# Patient Record
Sex: FEMALE | Race: ASIAN | NOT HISPANIC OR LATINO | URBAN - METROPOLITAN AREA
[De-identification: names, ages, dates, MRNs, and addresses within clinical notes are randomized per-mention and may not be internally consistent; named-entity substitution may affect disease eponyms.]

---

## 2021-03-24 ENCOUNTER — EMERGENCY (EMERGENCY)
Facility: HOSPITAL | Age: 26
LOS: 1 days | Discharge: HOME | End: 2021-03-24
Attending: EMERGENCY MEDICINE | Admitting: EMERGENCY MEDICINE
Payer: MEDICAID

## 2021-03-24 VITALS
RESPIRATION RATE: 18 BRPM | SYSTOLIC BLOOD PRESSURE: 118 MMHG | HEART RATE: 73 BPM | TEMPERATURE: 98 F | OXYGEN SATURATION: 100 % | DIASTOLIC BLOOD PRESSURE: 75 MMHG

## 2021-03-24 VITALS
OXYGEN SATURATION: 100 % | HEART RATE: 68 BPM | SYSTOLIC BLOOD PRESSURE: 135 MMHG | DIASTOLIC BLOOD PRESSURE: 80 MMHG | RESPIRATION RATE: 16 BRPM | TEMPERATURE: 98 F

## 2021-03-24 LAB
ALBUMIN SERPL ELPH-MCNC: 5 G/DL — SIGNIFICANT CHANGE UP (ref 3.3–5)
ALP SERPL-CCNC: 52 U/L — SIGNIFICANT CHANGE UP (ref 40–120)
ALT FLD-CCNC: 19 U/L — SIGNIFICANT CHANGE UP (ref 4–33)
ANION GAP SERPL CALC-SCNC: 11 MMOL/L — SIGNIFICANT CHANGE UP (ref 7–14)
APPEARANCE UR: SIGNIFICANT CHANGE UP
AST SERPL-CCNC: 20 U/L — SIGNIFICANT CHANGE UP (ref 4–32)
BACTERIA # UR AUTO: NEGATIVE — SIGNIFICANT CHANGE UP
BASOPHILS # BLD AUTO: 0.05 K/UL — SIGNIFICANT CHANGE UP (ref 0–0.2)
BASOPHILS NFR BLD AUTO: 0.7 % — SIGNIFICANT CHANGE UP (ref 0–2)
BILIRUB SERPL-MCNC: 0.8 MG/DL — SIGNIFICANT CHANGE UP (ref 0.2–1.2)
BILIRUB UR-MCNC: NEGATIVE — SIGNIFICANT CHANGE UP
BUN SERPL-MCNC: 8 MG/DL — SIGNIFICANT CHANGE UP (ref 7–23)
CALCIUM SERPL-MCNC: 9.4 MG/DL — SIGNIFICANT CHANGE UP (ref 8.4–10.5)
CHLORIDE SERPL-SCNC: 103 MMOL/L — SIGNIFICANT CHANGE UP (ref 98–107)
CO2 SERPL-SCNC: 24 MMOL/L — SIGNIFICANT CHANGE UP (ref 22–31)
COLOR SPEC: SIGNIFICANT CHANGE UP
CREAT SERPL-MCNC: 0.56 MG/DL — SIGNIFICANT CHANGE UP (ref 0.5–1.3)
DIFF PNL FLD: NEGATIVE — SIGNIFICANT CHANGE UP
EOSINOPHIL # BLD AUTO: 0.14 K/UL — SIGNIFICANT CHANGE UP (ref 0–0.5)
EOSINOPHIL NFR BLD AUTO: 1.8 % — SIGNIFICANT CHANGE UP (ref 0–6)
EPI CELLS # UR: 1 /HPF — SIGNIFICANT CHANGE UP (ref 0–5)
GLUCOSE SERPL-MCNC: 85 MG/DL — SIGNIFICANT CHANGE UP (ref 70–99)
GLUCOSE UR QL: NEGATIVE — SIGNIFICANT CHANGE UP
HCT VFR BLD CALC: 43.6 % — SIGNIFICANT CHANGE UP (ref 34.5–45)
HGB BLD-MCNC: 14.3 G/DL — SIGNIFICANT CHANGE UP (ref 11.5–15.5)
HYALINE CASTS # UR AUTO: 1 /LPF — SIGNIFICANT CHANGE UP (ref 0–7)
IANC: 5.38 K/UL — SIGNIFICANT CHANGE UP (ref 1.5–8.5)
IMM GRANULOCYTES NFR BLD AUTO: 0.3 % — SIGNIFICANT CHANGE UP (ref 0–1.5)
KETONES UR-MCNC: NEGATIVE — SIGNIFICANT CHANGE UP
LEUKOCYTE ESTERASE UR-ACNC: ABNORMAL
LYMPHOCYTES # BLD AUTO: 1.52 K/UL — SIGNIFICANT CHANGE UP (ref 1–3.3)
LYMPHOCYTES # BLD AUTO: 19.9 % — SIGNIFICANT CHANGE UP (ref 13–44)
MCHC RBC-ENTMCNC: 31.2 PG — SIGNIFICANT CHANGE UP (ref 27–34)
MCHC RBC-ENTMCNC: 32.8 GM/DL — SIGNIFICANT CHANGE UP (ref 32–36)
MCV RBC AUTO: 95 FL — SIGNIFICANT CHANGE UP (ref 80–100)
MONOCYTES # BLD AUTO: 0.53 K/UL — SIGNIFICANT CHANGE UP (ref 0–0.9)
MONOCYTES NFR BLD AUTO: 6.9 % — SIGNIFICANT CHANGE UP (ref 2–14)
NEUTROPHILS # BLD AUTO: 5.38 K/UL — SIGNIFICANT CHANGE UP (ref 1.8–7.4)
NEUTROPHILS NFR BLD AUTO: 70.4 % — SIGNIFICANT CHANGE UP (ref 43–77)
NITRITE UR-MCNC: NEGATIVE — SIGNIFICANT CHANGE UP
NRBC # BLD: 0 /100 WBCS — SIGNIFICANT CHANGE UP
NRBC # FLD: 0 K/UL — SIGNIFICANT CHANGE UP
PH UR: 6.5 — SIGNIFICANT CHANGE UP (ref 5–8)
PLATELET # BLD AUTO: 169 K/UL — SIGNIFICANT CHANGE UP (ref 150–400)
POTASSIUM SERPL-MCNC: 4.3 MMOL/L — SIGNIFICANT CHANGE UP (ref 3.5–5.3)
POTASSIUM SERPL-SCNC: 4.3 MMOL/L — SIGNIFICANT CHANGE UP (ref 3.5–5.3)
PROT SERPL-MCNC: 7.9 G/DL — SIGNIFICANT CHANGE UP (ref 6–8.3)
PROT UR-MCNC: NEGATIVE — SIGNIFICANT CHANGE UP
RBC # BLD: 4.59 M/UL — SIGNIFICANT CHANGE UP (ref 3.8–5.2)
RBC # FLD: 11.9 % — SIGNIFICANT CHANGE UP (ref 10.3–14.5)
RBC CASTS # UR COMP ASSIST: 2 /HPF — SIGNIFICANT CHANGE UP (ref 0–4)
SODIUM SERPL-SCNC: 138 MMOL/L — SIGNIFICANT CHANGE UP (ref 135–145)
SP GR SPEC: 1.01 — SIGNIFICANT CHANGE UP (ref 1.01–1.02)
UROBILINOGEN FLD QL: SIGNIFICANT CHANGE UP
WBC # BLD: 7.64 K/UL — SIGNIFICANT CHANGE UP (ref 3.8–10.5)
WBC # FLD AUTO: 7.64 K/UL — SIGNIFICANT CHANGE UP (ref 3.8–10.5)
WBC UR QL: 22 /HPF — HIGH (ref 0–5)

## 2021-03-24 PROCEDURE — 99285 EMERGENCY DEPT VISIT HI MDM: CPT

## 2021-03-24 PROCEDURE — 74177 CT ABD & PELVIS W/CONTRAST: CPT | Mod: 26

## 2021-03-24 RX ORDER — MORPHINE SULFATE 50 MG/1
4 CAPSULE, EXTENDED RELEASE ORAL ONCE
Refills: 0 | Status: DISCONTINUED | OUTPATIENT
Start: 2021-03-24 | End: 2021-03-24

## 2021-03-24 RX ORDER — SODIUM CHLORIDE 9 MG/ML
1000 INJECTION INTRAMUSCULAR; INTRAVENOUS; SUBCUTANEOUS ONCE
Refills: 0 | Status: COMPLETED | OUTPATIENT
Start: 2021-03-24 | End: 2021-03-24

## 2021-03-24 RX ORDER — CEPHALEXIN 500 MG
500 CAPSULE ORAL ONCE
Refills: 0 | Status: COMPLETED | OUTPATIENT
Start: 2021-03-24 | End: 2021-03-24

## 2021-03-24 RX ORDER — KETOROLAC TROMETHAMINE 30 MG/ML
15 SYRINGE (ML) INJECTION ONCE
Refills: 0 | Status: DISCONTINUED | OUTPATIENT
Start: 2021-03-24 | End: 2021-03-24

## 2021-03-24 RX ORDER — CEPHALEXIN 500 MG
1 CAPSULE ORAL
Qty: 21 | Refills: 0
Start: 2021-03-24 | End: 2021-03-30

## 2021-03-24 RX ADMIN — Medication 15 MILLIGRAM(S): at 17:07

## 2021-03-24 RX ADMIN — MORPHINE SULFATE 4 MILLIGRAM(S): 50 CAPSULE, EXTENDED RELEASE ORAL at 17:08

## 2021-03-24 RX ADMIN — SODIUM CHLORIDE 1000 MILLILITER(S): 9 INJECTION INTRAMUSCULAR; INTRAVENOUS; SUBCUTANEOUS at 17:07

## 2021-03-24 RX ADMIN — SODIUM CHLORIDE 1000 MILLILITER(S): 9 INJECTION INTRAMUSCULAR; INTRAVENOUS; SUBCUTANEOUS at 14:55

## 2021-03-24 RX ADMIN — MORPHINE SULFATE 4 MILLIGRAM(S): 50 CAPSULE, EXTENDED RELEASE ORAL at 18:48

## 2021-03-24 RX ADMIN — Medication 15 MILLIGRAM(S): at 14:54

## 2021-03-24 RX ADMIN — Medication 500 MILLIGRAM(S): at 20:24

## 2021-03-24 NOTE — ED PROVIDER NOTE - PROGRESS NOTE DETAILS
patient labs wnl, ua showing large leuk and wbc 22, will treat with keflex tid x 7 days, first dose to be given here. MARY Ontievros: Ct negative for acute Intra abdominal pathology. patient labs wnl, ua showing large leuk and wbc 22, will treat with keflex tid x 7 days, first dose to be given here. Patient points to suprapubic area, likely cystitis. Attempt to reach Dr. Moon patient PCP, at [phone]. number and name left with answering service for call back.

## 2021-03-24 NOTE — ED PROVIDER NOTE - PATIENT PORTAL LINK FT
You can access the FollowMyHealth Patient Portal offered by [facility] by registering at the following website: [email or web address]. By joining [employer]’s FollowMyHealth portal, you will also be able to view your health information using other applications (apps) compatible with our system.

## 2021-03-24 NOTE — ED PROVIDER NOTE - ABDOMINAL EXAM
Negative Bailon's sign, negative McBurney's, no rebound, no guarding./FIRM/tender.../nondistended/no organomegaly/no pulsating masses

## 2021-03-24 NOTE — ED PROVIDER NOTE - CHPI ED SYMPTOMS NEG
no vaginal bleeding or discharge/no blood in stool/no dysuria/no fever/no hematuria/no nausea/no vomiting/no chills

## 2021-03-24 NOTE — ED ADULT NURSE NOTE - OBJECTIVE STATEMENT
Pt awake and alert, developmentally delayed with father at bedside.   Pt c/o abdominal pain - uncomfortable appearance.   No recent fever, vomiting or diarrhea.   Abdomen soft and nondistended.   IV given with fluids and pain meds.   will continue to monitor closely.

## 2021-03-24 NOTE — ED PROVIDER NOTE - NSFOLLOWUPINSTRUCTIONS_ED_ALL_ED_FT
Follow up with your Primary Medical Doctor within 2-3days. If results or reports were given to you, show copies of your reports given to you. Take all of your medications as previously prescribed.    Please Follow up with OBGYN, referral list provided.     If you have issues obtaining follow up, please call: [phone] to obtain a doctor or specialist who takes your insurance in your area.    If wor Follow up with your Primary Medical Doctor within 2-3days. If results or reports were given to you, show copies of your reports given to you. Take all of your medications as previously prescribed.    Please Take Keflex 500mg three times a day for 7 days.  Take Motrin 600mg every 8hrs with food for pain.     Please Follow up with OBGYN, referral list provided.     If you have issues obtaining follow up, please call: [phone] to obtain a doctor or specialist who takes your insurance in your area.    If worsening pain, fevers, vomiting, or any other new or concerning symptoms return to the ED.

## 2021-03-24 NOTE — ED ADULT TRIAGE NOTE - CHIEF COMPLAINT QUOTE
pt c/o abdominal pain x1 week. Denies nausea/vomiting/diarrhea. last BM today. PMH: developmentally delayed

## 2021-03-24 NOTE — ED PROVIDER NOTE - OBJECTIVE STATEMENT
25 y/o F with PMHx of developmental delay here with father c/o abdominal pain x 1 week. Pain is intermittent and located at the periumbilical/ suprapubic region. Pt has been seeing PCP, but has not been given clear answer about etiology of pain. Father states that she had a PEG tube shortly after birth which was removed when she was child. Last BM  this morning. Denies having any blood or mucous in the stool, fever, chills, nausea, vomiting, dysuria, hematuria, vaginal bleeding, vaginal discharge, or other medical complaints. LMP 2 weeks prior and patient is not sexually active.

## 2021-03-25 LAB
CULTURE RESULTS: SIGNIFICANT CHANGE UP
SPECIMEN SOURCE: SIGNIFICANT CHANGE UP

## 2021-04-02 ENCOUNTER — EMERGENCY (EMERGENCY)
Facility: HOSPITAL | Age: 26
LOS: 1 days | Discharge: HOME | End: 2021-04-02
Attending: EMERGENCY MEDICINE | Admitting: EMERGENCY MEDICINE
Payer: MEDICAID

## 2021-04-02 VITALS
RESPIRATION RATE: 16 BRPM | SYSTOLIC BLOOD PRESSURE: 129 MMHG | DIASTOLIC BLOOD PRESSURE: 84 MMHG | OXYGEN SATURATION: 100 % | HEART RATE: 76 BPM | TEMPERATURE: 98 F

## 2021-04-02 VITALS — OXYGEN SATURATION: 100 % | DIASTOLIC BLOOD PRESSURE: 77 MMHG | HEART RATE: 70 BPM | RESPIRATION RATE: 16 BRPM

## 2021-04-02 LAB
APPEARANCE UR: CLEAR — SIGNIFICANT CHANGE UP
BILIRUB UR-MCNC: NEGATIVE — SIGNIFICANT CHANGE UP
COLOR SPEC: SIGNIFICANT CHANGE UP
DIFF PNL FLD: NEGATIVE — SIGNIFICANT CHANGE UP
EPI CELLS # UR: 2 /HPF — SIGNIFICANT CHANGE UP (ref 0–5)
GLUCOSE UR QL: NEGATIVE — SIGNIFICANT CHANGE UP
KETONES UR-MCNC: NEGATIVE — SIGNIFICANT CHANGE UP
LEUKOCYTE ESTERASE UR-ACNC: NEGATIVE — SIGNIFICANT CHANGE UP
NITRITE UR-MCNC: NEGATIVE — SIGNIFICANT CHANGE UP
PH UR: 6 — SIGNIFICANT CHANGE UP (ref 5–8)
PROT UR-MCNC: ABNORMAL
RBC CASTS # UR COMP ASSIST: 1 /HPF — SIGNIFICANT CHANGE UP (ref 0–4)
SP GR SPEC: 1.01 — SIGNIFICANT CHANGE UP (ref 1.01–1.02)
UROBILINOGEN FLD QL: SIGNIFICANT CHANGE UP
WBC UR QL: 1 /HPF — SIGNIFICANT CHANGE UP (ref 0–5)

## 2021-04-02 PROCEDURE — 99284 EMERGENCY DEPT VISIT MOD MDM: CPT

## 2021-04-02 RX ORDER — ACETAMINOPHEN 500 MG
650 TABLET ORAL ONCE
Refills: 0 | Status: COMPLETED | OUTPATIENT
Start: 2021-04-02 | End: 2021-04-02

## 2021-04-02 RX ORDER — FAMOTIDINE 10 MG/ML
1 INJECTION INTRAVENOUS
Qty: 7 | Refills: 0
Start: 2021-04-02 | End: 2021-04-08

## 2021-04-02 RX ADMIN — Medication 650 MILLIGRAM(S): at 12:17

## 2021-04-02 RX ADMIN — Medication 30 MILLILITER(S): at 15:33

## 2021-04-02 NOTE — ED PROVIDER NOTE - PHYSICAL EXAMINATION
PHYSICAL EXAM:  GENERAL: Sitting comfortable in bed, in no acute distress  HENMT: Atraumatic, moist mucous membranes, no oropharyngeal exudates or vesicles, uvula is midline EYES: Clear bilaterally, PERRL, EOMs intact b/l  HEART: RRR, S1/S2, no murmur/gallops/rubs  RESPIRATORY: Clear to auscultation bilaterally, no wheezes/rhonchi/rales  ABDOMEN: +BS, firm secondary to voluntary guarding/abdominal muscle contraction, no distension, (+) minimal suprapubic tenderness to palpation, nondistended, no rebound  EXTREMITIES: No lower extremity edema, +2 radial pulses b/l  NEURO:  A&Ox4, no focal motor deficits or sensory deficits   Heme/LYMPH: No ecchymosis or bruising, no anterior/posterior cervical or supraclavicular LAD  SKIN:  Skin normal color for race, warm, dry and intact. No evidence of rash.

## 2021-04-02 NOTE — ED ADULT TRIAGE NOTE - CHIEF COMPLAINT QUOTE
Pt presnets to ED for abd pain x3wks w/ 5 episodes of non bloody diarrhea yesterday. Denies nausea/ vomiting, dysuria, hematuria. Pt states she tested positive for COVID 3/5/21, has tested negative since. Pt father states she was prescribed antibiotics for UTI last week. Pmhx of Developmental delay.

## 2021-04-02 NOTE — ED PROVIDER NOTE - NS ED ROS FT
Gen: Denies fever, weight loss  CV: Denies chest pain, palpitations  Skin: Denies rash, erythema, color changes  Resp: Denies SOB, cough  Endo: Denies sensitivity to heat, cold, increased urination  GI: see HPI  Msk: Denies back pain, LE swelling, extremity pain  : see HPI  Neuro: Denies LOC, weakness, seizures  Psych: Denies hx of psych, hallucinations

## 2021-04-02 NOTE — ED PROVIDER NOTE - NSFOLLOWUPINSTRUCTIONS_ED_ALL_ED_FT
You were seen in the Emergency Department for lower abdominal pain. Please follow upw with your PCP, OBGYN, and GI doctor for additionally workup. Your CT scan and pelvic ultrasound in the ED at your visit last week was normal and your exam was not concerning for acute abdominal problem today.    1) Advance activity as tolerated.   2) Continue all previously prescribed medications as directed.    3) Follow up with your primary care physician in 24-48 hours - take copies of your results.    4) Return to the Emergency Department for worsening or persistent symptoms, and/or ANY NEW OR CONCERNING SYMPTOMS.

## 2021-04-02 NOTE — ED PROVIDER NOTE - PROGRESS NOTE DETAILS
Troy VASQUEZ: Pt denies improvement in pain, father at bedside provided additional history with , [id number], who states patient has had this pain for weeks, saw her PMD and GI with no resolution. Pt developed 5 episodes of diarrhea yesterday one day after completing abx for UTI. Negative UA, no urinary complaints. No additional abx. Will d/c with rx for antacid and simethicone and PCP f/u.

## 2021-04-02 NOTE — ED PROVIDER NOTE - OBJECTIVE STATEMENT
25 yo F, h/o developmental delay, p/w abdominal pain x 2 weeks. She was here on 3/24 for the same pain and received a CTAP that was negative except for small free fluid in the cul de sac. She also received a pelvic US, but there was no documented read. She had a questionably positive UA and was treated with keflex but denies improvement in symptoms. She is a relatively poor historian but additionally reports urinary frequency, periumbilical/suprapubic pain, denies nausea, vomiting, fevers, chills, dysuria, hematuria, hematochezia, melena, diarrhea, chest pain, cough, or other complaints at this time. Pt lives at home with parents and brother, reports feeling safe at home, no one is hurting or threatening her. She is in school and denies sexually activity.

## 2021-04-02 NOTE — ED PROVIDER NOTE - PATIENT PORTAL LINK FT
You can access the FollowMyHealth Patient Portal offered by [facility] by registering at the following website: [email or web address]. By joining [employer]’s FollowMyHealth portal, you will also be able to view your health information using other applications (apps) compatible with our system.

## 2021-04-02 NOTE — ED PROVIDER NOTE - CARE PROVIDER_API CALL
Teodora Moon  Internal Medicine  [address] [facility] [place] [address]  Phone: [phone]  Fax: [phone]  Follow Up Time: 1-3 Days

## 2021-04-02 NOTE — ED PROVIDER NOTE - ATTENDING CONTRIBUTION TO CARE
Seen and examined, states abd pain, periumbilical, constant, no assoc. with food, no N/V/D, no fever/chills, no sick contacts, no sore throat/cough/congestions. States not worse with urination but then states not sure if can provide urine in ED due to inc. pain. Seen in ED 2d ago, had ED U/S and CT A/P showing normal appendix, small fluid in cul-de-sac, UA with WBC/leuk and tx for UTI w Keflex. Has taken for 2d and cont. to c/o pain. Brought by parent, pt. with hx developmental delay. MMM, clear lungs ,heart reg, abd soft, flat, voluntary guarding, able to distract, no maryann/guarding, mild periumbilical tenderness, no  lower quad tenderness, no ecchymoses, no CVAT, no hernia, no pain with ext. movements.

## 2021-04-02 NOTE — ED PROVIDER NOTE - CLINICAL SUMMARY MEDICAL DECISION MAKING FREE TEXT BOX
Troy VASQUEZ, PGY1: 25 yo h/o developmental delay here for suprapubic pain, seen recently for likely uti s/p negative CT AP for abdominal pain. Pt is poor historian and father not at bedside, will attempt to speak with family. HD stable, afebrile, nontoxic, will review imaging from one week ago, repeat UA/UCx and likely d/c with cipro course.

## 2021-04-03 LAB
CULTURE RESULTS: SIGNIFICANT CHANGE UP
SPECIMEN SOURCE: SIGNIFICANT CHANGE UP

## 2022-04-13 NOTE — ED PROVIDER NOTE - CHIEF COMPLAINT
I called [address] @ [phone] and spoke to Blessing Posada () to check eligible for the patient and as of 4/13/2022 the patient has current active coverage as of 1/18/2022  According to Blessing Posada () this patient plan requests a [address] referral in order to be seen by a specialist  The reference number for this call @ [id number]   Lorena Baker,  
The patient is a 26y Female complaining of abdominal pain.

## 2022-09-12 NOTE — ED PROVIDER NOTE - PRINCIPAL DIAGNOSIS
Hospital Medicine  Progress note    Team: [facility] MED Z Mihaela Barbour MD  Admit Date: 9/11/2022    Principal Problem:  Dyspnea    Interval hx:  he feels SOB improving    ROS   Respiratory: neg for cough neg for shortness of breath  Cardiovascular: neg for chest pain neg for palpitations  Gastrointestinal: neg for nausea neg for vomiting, neg for abdominal pain neg for diarrhea neg for constipation   Behavioral/Psych: neg for depression neg for anxiety    PEx  Temp:  [97.5 °F (36.4 °C)-98.2 °F (36.8 °C)]   Pulse:  []   Resp:  [15-20]   BP: (100-158)/(66-91)   SpO2:  [93 %-100 %]     Intake/Output Summary (Last 24 hours) at 9/12/2022 1513  Last data filed at 9/12/2022 1300  Gross per 24 hour   Intake 222 ml   Output 975 ml   Net -753 ml       General Appearance: no acute distress, WDWN  Heart: regular rate and rhythm, no heave, JVD 4-5 cm  Respiratory: Normal respiratory effort, symmetric excursion, bilateral vesicular breath sounds   Abdomen: Soft, non-tender; bowel sounds active  Skin: intact, no rash, no ulcers  Neurologic:  No focal numbness or weakness  Mental status: Alert, oriented x 4, affect appropriate     Recent Labs   Lab 09/11/22  1552 09/11/22  1610 09/12/22  0552   WBC  --  8.06 8.44   HGB  --  13.3* 11.9*   HCT 47 40.2 35.5*   PLT  --  218 205     Recent Labs   Lab 09/11/22 1807 09/12/22  0552    138   K 5.3* 4.0    111*   CO2 18* 21*   BUN 26* 23   CREATININE 1.3 1.0    79   CALCIUM 9.4 8.9   MG  --  1.9   PHOS  --  3.7     Recent Labs   Lab 09/11/22  1610 09/11/22 1807 09/11/22  2054   ALKPHOS  --  53*  --    ALT  --  47*  --    AST  --  44*  --    ALBUMIN  --  3.9  --    PROT  --  6.7  --    BILITOT  --  0.4  --    INR 1.2  --  1.2        No results for input(s): POCTGLUCOSE in the last 168 hours.    Scheduled Meds:   aspirin  81 mg Oral Daily    atorvastatin  40 mg Oral QHS    carvediloL  3.125 mg Oral BID WM    clopidogreL  75 mg Oral Daily    enoxaparin  40 mg  Subcutaneous Daily    furosemide (LASIX) injection  40 mg Intravenous Daily     Continuous Infusions:  As Needed:  acetaminophen, acetaminophen, albuterol-ipratropium, aluminum-magnesium hydroxide-simethicone, bisacodyL, dextrose 10%, dextrose 10%, glucagon (human recombinant), glucose, glucose, melatonin, naloxone, ondansetron, polyethylene glycol, prochlorperazine, simethicone, sodium chloride 0.9%    Assessment and Plan  / Problems managed today    * Dyspnea  Concern for PE based on presentation, elevated D-dimer, and elevated trop; however unable to get CTA tonight due to already receiving IV contrast for MRI in ED.     - Satting 100% on RA  - Well's score: 4.5  - CXR pending  - BLE US ordered  - Will hold on staring heparin gtt for now until more results available but low threshold to start if becomes hypoxic or hemodynamically unstable   - Order CTA in AM    CHARLES (acute kidney injury)  - Cr 1.3, baseline 0.9  - Hold IVFs for now given EF 20% and encourage PO hydration  - Check UA  - avoid nephrotoxins, renally dose meds  - Serial BMPs    Elevated troponin  - Troponin 0.513>>0.622  - Trend trop until peak  - Cardiology consulted; recommend starting DAPT given TIA but hold on heparin gtt   - Will need interventional cards consult in AM if trop continues to rise  - NPO at midnight as precaution  - EKG PRN chest pain    Episode of transient neurologic symptoms  - Vasc Neuro consulted; no acute intervention given MRI brain negative for acute stroke  - Start ASA, plavix, and atorvastatin given c/f TIA per cards recs  - Follow up TTE  - Neuro checks q4h    Chronic combined systolic and diastolic congestive heart failure  - Appears compensated on physical exam  - BNP 1537, CXR pending  - Repeat TTE to eval for possible RH strain given c/f PE  - Continue BB, hold entresto given CHARLES  - Strict I/Os, daily weights    Essential hypertension  - Continue coreg 3.125 mg PO BID   - Hold entresto given CHARLES    Hyperlipidemia  -  LDL 93 in 8/22  - Start atorvastatin 40mg daily       Discharge Planning   GEETA:      Code Status: Full Code   Is the patient medically ready for discharge?: No    Reason for patient still in hospital (select all that apply): Laboratory test and Consult recommendations  Discharge Plan A: Home with family        Diet:  low sodium diet  GI PPx: protonix  DVT PPx:  enoxaparin  Airways: room air  Wounds: none    Goals of Care:  Return to prior functional status    Time (minutes) spent in care of the patient (Greater than 1/2 spent in direct face-to-face contact and care coordination on unit)  35 min    Mihaela Barbour MD     Abdominal pain

## 2022-12-01 NOTE — ED ADULT TRIAGE NOTE - MODE OF ARRIVAL
"Former patient of Ajit & has not established care with another provider.  Please assign refill request to covering provider per clinic standard process.    Routing refill request to provider for review/approval because:  A break in medication  No PCP - this patient appears to be seeing Dr. Steve.  Please change PCP to reflect this change from SANCHEZ Fong.    Last Written Prescription Date:  7/29/21  Last Fill Quantity: 90,  # refills: 3   Last office visit provider:  10/26/22     Requested Prescriptions   Pending Prescriptions Disp Refills     atorvastatin (LIPITOR) 80 MG tablet 90 tablet 3     Sig: Take 1 tablet (80 mg) by mouth daily       Statins Protocol Passed - 12/1/2022  3:25 PM        Passed - LDL on file in past 12 months     Recent Labs   Lab Test 03/14/22  1049                Passed - No abnormal creatine kinase in past 12 months     No lab results found.             Passed - Recent (12 mo) or future (30 days) visit within the authorizing provider's specialty     Patient has had an office visit with the authorizing provider or a provider within the authorizing providers department within the previous 12 mos or has a future within next 30 days. See \"Patient Info\" tab in inbasket, or \"Choose Columns\" in Meds & Orders section of the refill encounter.              Passed - Medication is active on med list        Passed - Patient is age 18 or older        Passed - No active pregnancy on record        Passed - No positive pregnancy test in past 12 months             Heraclio Ortiz RN 12/01/22 3:25 PM  " Walk in

## 2023-06-11 NOTE — ED PROVIDER NOTE - CLINICAL SUMMARY MEDICAL DECISION MAKING FREE TEXT BOX
27 y/o F with PMHx of developmental delay here with abdominal pain. Will evaluate for appendicitis, diverticulitis, pregnancy, and UTI. Plan to obtain CBC, CMP, CT abdomen/pelvis, UA, urine culture, urine pregnancy, and give pain medications. ED US to perform transabdominal US. Discussed importance of regular GYN visit with patient and father as she has never previously visited a GYN.
no

## 2024-09-03 ENCOUNTER — EMERGENCY (EMERGENCY)
Facility: HOSPITAL | Age: 29
LOS: 1 days | Discharge: HOME | End: 2024-09-03
Admitting: EMERGENCY MEDICINE
Payer: COMMERCIAL

## 2024-09-03 VITALS
OXYGEN SATURATION: 100 % | HEART RATE: 75 BPM | DIASTOLIC BLOOD PRESSURE: 95 MMHG | RESPIRATION RATE: 18 BRPM | WEIGHT: 115.96 LBS | TEMPERATURE: 98 F | SYSTOLIC BLOOD PRESSURE: 137 MMHG

## 2024-09-03 PROCEDURE — 73502 X-RAY EXAM HIP UNI 2-3 VIEWS: CPT | Mod: 26,RT

## 2024-09-03 PROCEDURE — 72100 X-RAY EXAM L-S SPINE 2/3 VWS: CPT | Mod: 26

## 2024-09-03 PROCEDURE — 99284 EMERGENCY DEPT VISIT MOD MDM: CPT

## 2024-09-03 PROCEDURE — 72170 X-RAY EXAM OF PELVIS: CPT | Mod: 26

## 2024-09-03 RX ORDER — ACETAMINOPHEN 325 MG/1
650 TABLET ORAL ONCE
Refills: 0 | Status: COMPLETED | OUTPATIENT
Start: 2024-09-03 | End: 2024-09-03

## 2024-09-03 RX ORDER — KETOROLAC TROMETHAMINE 30 MG/ML
30 INJECTION, SOLUTION INTRAMUSCULAR ONCE
Refills: 0 | Status: DISCONTINUED | OUTPATIENT
Start: 2024-09-03 | End: 2024-09-03

## 2024-09-03 RX ORDER — LIDOCAINE/BENZALKONIUM/ALCOHOL
1 SOLUTION, NON-ORAL TOPICAL ONCE
Refills: 0 | Status: COMPLETED | OUTPATIENT
Start: 2024-09-03 | End: 2024-09-03

## 2024-09-03 RX ADMIN — KETOROLAC TROMETHAMINE 30 MILLIGRAM(S): 30 INJECTION, SOLUTION INTRAMUSCULAR at 12:32

## 2024-09-03 RX ADMIN — Medication 1 PATCH: at 12:33

## 2024-09-03 RX ADMIN — ACETAMINOPHEN 650 MILLIGRAM(S): 325 TABLET ORAL at 12:33

## 2024-09-03 NOTE — ED PROVIDER NOTE - OBJECTIVE STATEMENT
Patient is 30yo F PMHx developmental delay presents with mother and father with c/o right low back/hip pain x1 month, worse since yesterday. Patient went to urgent care yesterday with parents and was referred to ER for xrays of the low back and hip. Patient denies trauma, reports she was at [place] one month ago, came home from camp and noticed the pain. Father reports patient possibly fell in the bathtub recently. Patient/parents deny the patient taking anything for pain. Patient endorses she is incontinent of stool and urine at her baseline.  Patient poor historian, looks to father to help answer some questions. Patient denies trauma, numbness, tingling, weakness, urinary burning, urgency, frequency, vaginal discharge, abdominal pain.

## 2024-09-03 NOTE — ED PROVIDER NOTE - NSPTACCESSSVCSAPPTDETAILS_ED_ALL_ED_FT
right low back / SI joint pain. right low back / SI joint pain. xrays negative for acute fracture or dislocation, shows retrolisthesis and scoliosis. please make appointment via father.

## 2024-09-03 NOTE — ED PROVIDER NOTE - CLINICAL SUMMARY MEDICAL DECISION MAKING FREE TEXT BOX
ALLEGRA Sidhu NP Fellow: Patient is 28yo F PMHx developmental delay presents with mother and father with c/o right low back/hip pain x1 month, worse since yesterday. Patient denies trauma, numbness, tingling, weakness, urinary burning, urgency, frequency, vaginal discharge, abdominal pain. Patient is nontoxic but uncomfortable appearing. Physical exam pertinent for  + right lumbar paraspinal tenderness. No cervical, thoracic, lumbar, or sacral spinal tenderness. No hematoma, bruising, skin lesions to back. Full ROM and Strength 5+/5+ B/L upper and lower extremities including hips. Soft compartments B/L lower extremities. Ambulating with steady gait. Negative straight leg test. Abdomen is soft, non-distended, non-tender, no CVA tenderness. Patient likely with MSK pain vs SI joint pain. Less concern for acute spinal injury in the absence of spinal tenderness on exam, no red flag signs. Less concern for UTI vs kidney stone in the absence of urinary complaints and absence of CVA tenderness. Will order Tylenol, IM Toradol, lidocaine patch, and xrays of the lumbar spine, right hip and pelvis per shared decision making discussion with father, and reassess. Patient will likely be discharged home with spine specialist follow up. Patient and parents agreeable to plan. Parents at bedside contributing to history and shared decision making.

## 2024-09-03 NOTE — ED PROVIDER NOTE - NSFOLLOWUPINSTRUCTIONS_ED_ALL_ED_FT
- You were seen in the ER today for back pain.    - You were given IM Toradol, Tylenol and a lidocaine patch.     - Xrays of the low back, right hip, and pelvis were performed which were negative for acute fracture or dislocation.     - Take Tylenol 650mg (Two 325 mg pills) every 4-6 hours as needed for pain.    - Take Ibuprofen 600 mg (Three 200 mg pills) every 8 hours as needed for pain --- take with food.     - You can use over the counter Lidocaine patches for pain, these can stay ON for 12 hours, then must come OFF for 12 hours. You can apply one every 24 hours as needed for pain.     - Please follow up with the spine specialist, A referral coordinator will call you in the next 2-3 business days to assist you in obtaining a follow up appointment.   Spine Center  [phone] - SPINE   [phone]  [email or web address]      - Patient return to the ER for severe or worsening pain, bowel or bladder control problems, unusual weakness or numbness in your arms or legs, inability to ambulate, nausea or vomiting, abdominal pain, fever, dizziness/lightheadedness, or any other concerns.       Back Pain    Back pain is very common in adults. The cause of back pain is rarely dangerous and the pain often gets better over time. The cause of your back pain may not be known and may include strain of muscles or ligaments, degeneration of the spinal disks, or arthritis. Occasionally the pain may radiate down your leg(s). Over-the-counter medicines to reduce pain and inflammation are often the most helpful. Stretching and remaining active frequently helps the healing process. - You were seen in the ER today for back pain.    - You were given IM Toradol, Tylenol and a lidocaine patch.     - Xrays of the low back, right hip, and pelvis were performed which were negative for acute fracture or dislocation. Xrays show chronic changes in the curvature of the spine.     - Take Tylenol 650mg (Two 325 mg pills) every 4-6 hours as needed for pain.    - Take Ibuprofen 600 mg (Three 200 mg pills) every 8 hours as needed for pain --- take with food.     - You can use over the counter Lidocaine patches for pain, these can stay ON for 12 hours, then must come OFF for 12 hours. You can apply one every 24 hours as needed for pain.     - Please follow up with the spine specialist, A referral coordinator will call you in the next 2-3 business days to assist you in obtaining a follow up appointment.   Spine Center  [phone] SPINE   [phone]  [email or web address]      - Patient return to the ER for severe or worsening pain, bowel or bladder control problems, unusual weakness or numbness in your arms or legs, inability to ambulate, nausea or vomiting, abdominal pain, fever, dizziness/lightheadedness, or any other concerns.       Back Pain    Back pain is very common in adults. The cause of back pain is rarely dangerous and the pain often gets better over time. The cause of your back pain may not be known and may include strain of muscles or ligaments, degeneration of the spinal disks, or arthritis. Occasionally the pain may radiate down your leg(s). Over-the-counter medicines to reduce pain and inflammation are often the most helpful. Stretching and remaining active frequently helps the healing process. - You were seen in the ER today for back pain.    - You were given IM Toradol, Tylenol and a lidocaine patch.     - Xrays of the low back, right hip, and pelvis were performed which were negative for acute fracture or dislocation. Xrays show chronic changes in the curvature of the spine.     - Take Tylenol 650mg (Two 325 mg pills) every 4-6 hours as needed for pain.    - Take Ibuprofen 600 mg (Three 200 mg pills) every 8 hours as needed for pain --- take with food.     - You can use over the counter Lidocaine patches for pain, these can stay ON for 12 hours, then must come OFF for 12 hours. You can apply one every 24 hours as needed for pain.     - Apply hot packs or heating back to back 20 minutes several times daily for pain.     - Please follow up with the spine specialist, A referral coordinator will call you in the next 2-3 business days to assist you in obtaining a follow up appointment.   Spine Center  [phone] SPINE   [phone]  [email or web address]      - Patient return to the ER for severe or worsening pain, bowel or bladder control problems, unusual weakness or numbness in your arms or legs, inability to ambulate, nausea or vomiting, abdominal pain, fever, dizziness/lightheadedness, or any other concerns.       Back Pain    Back pain is very common in adults. The cause of back pain is rarely dangerous and the pain often gets better over time. The cause of your back pain may not be known and may include strain of muscles or ligaments, degeneration of the spinal disks, or arthritis. Occasionally the pain may radiate down your leg(s). Over-the-counter medicines to reduce pain and inflammation are often the most helpful. Stretching and remaining active frequently helps the healing process.

## 2024-09-03 NOTE — ED PROVIDER NOTE - PROGRESS NOTE DETAILS
Joe Navarrete, EM NP Fellow: Xrays negative for acute fracture or dislocation, with Grade 1 retrolisthesis at L2-L3 and L3-L4. Mild levoscoliosis.. radiology results have been discussed with patient and parents. Patient with some improvement in pain after medications. Patient is ready for discharge home. Vital signs reviewed and hemodynamically stable. Patient/father provided time to ask questions.  All questions were answered at bedside with reasons to return explained at length. Supervising Statement (JERSEY HERNANDEZ): I have personally seen and examined this patient.  I have fully participated in the care of this patient. I have reviewed all pertinent clinical information, including history, physical exam, plan and the ACP Fellow's note and agree except as noted.

## 2024-09-03 NOTE — ED PROVIDER NOTE - PATIENT PORTAL LINK FT
You can access the FollowMyHealth Patient Portal offered by [facility] by registering at the following website: [email or web address]. By joining [employer]’s FollowMyHealth portal, you will also be able to view your health information using other applications (apps) compatible with our system.

## 2024-09-03 NOTE — ED PROVIDER NOTE - PHYSICAL EXAMINATION
CONSTITUTIONAL: Alert and Oriented x3, NAD, resting comfortably, well groomed  HEAD: Normocephalic, atraumatic.  NECK:  Airway patent. Neck Supple. FROM without pain.  CARDIAC: Heart regular, normal S1/2, no murmurs noted. No chest wall tenderness or deformity.  RESPIRATORY: Lung sounds clear B/L, good aeration. No wheezing, rales, adventitious breath sounds. No accessory muscle use.    ABDOMEN: soft, non-distended; non-tender to palpation, no rebound tenderness or guarding, negative Bailon's sign, negative McBurney's point tenderness, bowel sounds present x4 quadrants, no pulsating masses, scars. No CVA tenderness.  MSK: Moving all extremities. No cervical, thoracic, lumbar, or sacral spinal tenderness. + right lumbar paraspinal tenderness. No hematoma, bruising, skin lesions to back. Full ROM and Strength 5+/5+ B/L upper and lower extremities including hips. Soft compartments B/L lower extremities. Ambulating with steady gait. Negative straight leg test.   SKIN: Warm, dry, color WNL, no turgor, erythema or rashes. Capillary refill < 2 seconds.   NEURO: alert and interactive, cooperative, pleasant, oriented x3, no focal deficits. No paresthesias.

## 2024-09-03 NOTE — ED ADULT NURSE NOTE - OBJECTIVE STATEMENT
patient alert and oriented x4 ambulatory, c/o right hip pain x 1 month, patient denies trauma to area. She was seen at urgent care and was told to come to ED for x-rays. patient well appearing, no acute distress noted. medication given per EMAR, tolerated well

## 2024-09-03 NOTE — ED ADULT TRIAGE NOTE - CHIEF COMPLAINT QUOTE
pt c/o right hip pain x 1 month.  denies trauma.  pt was seen at urgent care and was told to come to ED for x-rays.  pt is ambulatory to triage.  Hx:  denies

## 2024-09-04 ENCOUNTER — EMERGENCY (EMERGENCY)
Facility: HOSPITAL | Age: 29
LOS: 1 days | Discharge: HOME | End: 2024-09-04
Admitting: EMERGENCY MEDICINE
Payer: MEDICAID

## 2024-09-04 VITALS
OXYGEN SATURATION: 99 % | RESPIRATION RATE: 17 BRPM | HEART RATE: 81 BPM | TEMPERATURE: 98 F | SYSTOLIC BLOOD PRESSURE: 118 MMHG | DIASTOLIC BLOOD PRESSURE: 83 MMHG

## 2024-09-04 PROCEDURE — 99284 EMERGENCY DEPT VISIT MOD MDM: CPT

## 2024-09-04 PROCEDURE — 72125 CT NECK SPINE W/O DYE: CPT | Mod: 26,MC

## 2024-09-04 PROCEDURE — 72131 CT LUMBAR SPINE W/O DYE: CPT | Mod: 26,MC

## 2024-09-04 RX ORDER — ACETAMINOPHEN 500 MG/5ML
975 LIQUID (ML) ORAL ONCE
Refills: 0 | Status: COMPLETED | OUTPATIENT
Start: 2024-09-04 | End: 2024-09-04

## 2024-09-04 RX ORDER — ONDANSETRON HCL/PF 4 MG/2 ML
4 VIAL (ML) INJECTION ONCE
Refills: 0 | Status: COMPLETED | OUTPATIENT
Start: 2024-09-04 | End: 2024-09-04

## 2024-09-04 RX ORDER — LIDOCAINE HYDROCHLORIDE 20 MG/ML
1 JELLY TOPICAL ONCE
Refills: 0 | Status: COMPLETED | OUTPATIENT
Start: 2024-09-04 | End: 2024-09-04

## 2024-09-04 RX ORDER — DIAZEPAM 5 MG/1
5 TABLET ORAL ONCE
Refills: 0 | Status: DISCONTINUED | OUTPATIENT
Start: 2024-09-04 | End: 2024-09-04

## 2024-09-04 RX ORDER — OXYCODONE HYDROCHLORIDE 30 MG/1
5 TABLET ORAL ONCE
Refills: 0 | Status: DISCONTINUED | OUTPATIENT
Start: 2024-09-04 | End: 2024-09-04

## 2024-09-04 RX ORDER — KETOROLAC TROMETHAMINE 30 MG/ML
30 INJECTION, SOLUTION INTRAMUSCULAR; INTRAVENOUS ONCE
Refills: 0 | Status: DISCONTINUED | OUTPATIENT
Start: 2024-09-04 | End: 2024-09-04

## 2024-09-04 RX ORDER — DIAZEPAM 5 MG/1
1 TABLET ORAL
Qty: 9 | Refills: 0
Start: 2024-09-04 | End: 2024-09-06

## 2024-09-04 RX ADMIN — Medication 4 MILLIGRAM(S): at 20:00

## 2024-09-04 RX ADMIN — Medication 975 MILLIGRAM(S): at 17:26

## 2024-09-04 RX ADMIN — DIAZEPAM 5 MILLIGRAM(S): 5 TABLET ORAL at 14:39

## 2024-09-04 RX ADMIN — OXYCODONE HYDROCHLORIDE 5 MILLIGRAM(S): 30 TABLET ORAL at 17:25

## 2024-09-04 RX ADMIN — LIDOCAINE HYDROCHLORIDE 1 PATCH: 20 JELLY TOPICAL at 17:25

## 2024-09-04 RX ADMIN — KETOROLAC TROMETHAMINE 30 MILLIGRAM(S): 30 INJECTION, SOLUTION INTRAMUSCULAR; INTRAVENOUS at 14:40

## 2024-09-20 ENCOUNTER — APPOINTMENT (OUTPATIENT)
Age: 29
End: 2024-09-20